# Patient Record
Sex: FEMALE | Race: OTHER | NOT HISPANIC OR LATINO | URBAN - METROPOLITAN AREA
[De-identification: names, ages, dates, MRNs, and addresses within clinical notes are randomized per-mention and may not be internally consistent; named-entity substitution may affect disease eponyms.]

---

## 2019-01-04 ENCOUNTER — OUTPATIENT (OUTPATIENT)
Dept: OUTPATIENT SERVICES | Facility: HOSPITAL | Age: 34
LOS: 1 days | End: 2019-01-04

## 2019-01-04 VITALS
TEMPERATURE: 98 F | RESPIRATION RATE: 14 BRPM | SYSTOLIC BLOOD PRESSURE: 106 MMHG | HEIGHT: 65 IN | DIASTOLIC BLOOD PRESSURE: 70 MMHG | OXYGEN SATURATION: 99 % | HEART RATE: 74 BPM | WEIGHT: 214.95 LBS

## 2019-01-04 DIAGNOSIS — O03.9 COMPLETE OR UNSPECIFIED SPONTANEOUS ABORTION WITHOUT COMPLICATION: Chronic | ICD-10-CM

## 2019-01-04 DIAGNOSIS — Z33.2 ENCOUNTER FOR ELECTIVE TERMINATION OF PREGNANCY: ICD-10-CM

## 2019-01-04 DIAGNOSIS — Z98.891 HISTORY OF UTERINE SCAR FROM PREVIOUS SURGERY: Chronic | ICD-10-CM

## 2019-01-04 DIAGNOSIS — Z98.890 OTHER SPECIFIED POSTPROCEDURAL STATES: Chronic | ICD-10-CM

## 2019-01-04 LAB
BLD GP AB SCN SERPL QL: NEGATIVE — SIGNIFICANT CHANGE UP
HCT VFR BLD CALC: 40.1 % — SIGNIFICANT CHANGE UP (ref 34.5–45)
HGB BLD-MCNC: 13.5 G/DL — SIGNIFICANT CHANGE UP (ref 11.5–15.5)
MCHC RBC-ENTMCNC: 29.3 PG — SIGNIFICANT CHANGE UP (ref 27–34)
MCHC RBC-ENTMCNC: 33.7 % — SIGNIFICANT CHANGE UP (ref 32–36)
MCV RBC AUTO: 87.2 FL — SIGNIFICANT CHANGE UP (ref 80–100)
NRBC # FLD: 0 — SIGNIFICANT CHANGE UP
PLATELET # BLD AUTO: 202 K/UL — SIGNIFICANT CHANGE UP (ref 150–400)
PMV BLD: 10.9 FL — SIGNIFICANT CHANGE UP (ref 7–13)
RBC # BLD: 4.6 M/UL — SIGNIFICANT CHANGE UP (ref 3.8–5.2)
RBC # FLD: 12.9 % — SIGNIFICANT CHANGE UP (ref 10.3–14.5)
RH IG SCN BLD-IMP: NEGATIVE — SIGNIFICANT CHANGE UP
WBC # BLD: 9.69 K/UL — SIGNIFICANT CHANGE UP (ref 3.8–10.5)
WBC # FLD AUTO: 9.69 K/UL — SIGNIFICANT CHANGE UP (ref 3.8–10.5)

## 2019-01-04 RX ORDER — SODIUM CHLORIDE 9 MG/ML
1000 INJECTION, SOLUTION INTRAVENOUS
Qty: 0 | Refills: 0 | Status: DISCONTINUED | OUTPATIENT
Start: 2019-01-07 | End: 2019-01-22

## 2019-01-04 NOTE — H&P PST ADULT - NSANTHOSAYNRD_GEN_A_CORE
No. DAMIEN screening performed.  STOP BANG Legend: 0-2 = LOW Risk; 3-4 = INTERMEDIATE Risk; 5-8 = HIGH Risk

## 2019-01-04 NOTE — H&P PST ADULT - GASTROINTESTINAL DETAILS
no guarding/soft/no masses palpable/no distention/bowel sounds normal/no bruit/no rebound tenderness/no rigidity/no organomegaly/nontender

## 2019-01-04 NOTE — H&P PST ADULT - RS GEN PE MLT RESP DETAILS PC
clear to auscultation bilaterally/no intercostal retractions/no rales/no chest wall tenderness/no rhonchi/breath sounds equal/respirations non-labored/good air movement/no wheezes

## 2019-01-04 NOTE — H&P PST ADULT - NEGATIVE GENERAL GENITOURINARY SYMPTOMS
no hematuria/no dysuria/no flank pain L/no flank pain R/no bladder infections/normal urinary frequency

## 2019-01-04 NOTE — H&P PST ADULT - NEGATIVE GENERAL SYMPTOMS
no fatigue/no malaise/no sweating/no anorexia/no chills/no weight loss/no fever/no polyphagia/no polyuria/no polydipsia

## 2019-01-04 NOTE — H&P PST ADULT - NEGATIVE CARDIOVASCULAR SYMPTOMS
no paroxysmal nocturnal dyspnea/no peripheral edema/no chest pain/no claudication/no palpitations/no orthopnea/no dyspnea on exertion

## 2019-01-04 NOTE — H&P PST ADULT - PROBLEM SELECTOR PLAN 1
Pt scheduled for elective dilation vacuum curettage on 1/7/2019.  labs done results pending, Preop teaching done, pt able to verbalize understanding.

## 2019-01-04 NOTE — H&P PST ADULT - HISTORY OF PRESENT ILLNESS
34y/o female scheduled for elective dilation vacuum curettage on 2019.  Pt states, "LMP 2018, elective ."

## 2019-01-06 ENCOUNTER — TRANSCRIPTION ENCOUNTER (OUTPATIENT)
Age: 34
End: 2019-01-06

## 2019-01-07 ENCOUNTER — OUTPATIENT (OUTPATIENT)
Dept: OUTPATIENT SERVICES | Facility: HOSPITAL | Age: 34
LOS: 1 days | Discharge: HOME | End: 2019-01-07
Payer: COMMERCIAL

## 2019-01-07 ENCOUNTER — RESULT REVIEW (OUTPATIENT)
Age: 34
End: 2019-01-07

## 2019-01-07 VITALS
DIASTOLIC BLOOD PRESSURE: 62 MMHG | RESPIRATION RATE: 16 BRPM | HEIGHT: 65 IN | TEMPERATURE: 99 F | SYSTOLIC BLOOD PRESSURE: 112 MMHG | WEIGHT: 214.95 LBS | HEART RATE: 64 BPM | OXYGEN SATURATION: 99 %

## 2019-01-07 VITALS
HEART RATE: 66 BPM | DIASTOLIC BLOOD PRESSURE: 62 MMHG | SYSTOLIC BLOOD PRESSURE: 120 MMHG | RESPIRATION RATE: 16 BRPM | OXYGEN SATURATION: 100 %

## 2019-01-07 DIAGNOSIS — O03.9 COMPLETE OR UNSPECIFIED SPONTANEOUS ABORTION WITHOUT COMPLICATION: Chronic | ICD-10-CM

## 2019-01-07 DIAGNOSIS — Z98.891 HISTORY OF UTERINE SCAR FROM PREVIOUS SURGERY: Chronic | ICD-10-CM

## 2019-01-07 DIAGNOSIS — Z33.2 ENCOUNTER FOR ELECTIVE TERMINATION OF PREGNANCY: ICD-10-CM

## 2019-01-07 DIAGNOSIS — Z98.890 OTHER SPECIFIED POSTPROCEDURAL STATES: Chronic | ICD-10-CM

## 2019-01-07 LAB
BLD GP AB SCN SERPL QL: NEGATIVE — SIGNIFICANT CHANGE UP
RH IG SCN BLD-IMP: NEGATIVE — SIGNIFICANT CHANGE UP

## 2019-01-07 PROCEDURE — 88305 TISSUE EXAM BY PATHOLOGIST: CPT | Mod: 26

## 2019-01-07 RX ORDER — IBUPROFEN 200 MG
1 TABLET ORAL
Qty: 0 | Refills: 0 | COMMUNITY

## 2019-01-07 NOTE — BRIEF OPERATIVE NOTE - PROCEDURE
<<-----Click on this checkbox to enter Procedure Dilation and curettage  01/07/2019    Active  [id number]

## 2019-01-07 NOTE — ASU DISCHARGE PLAN (ADULT/PEDIATRIC). - NOTIFY
GYN Fever>100.4/Excessive Diarrhea/Bleeding that does not stop/Persistent Nausea and Vomiting/Inability to Tolerate Liquids or Foods/Pain not relieved by Medications/Unable to Urinate

## 2019-01-07 NOTE — ASU DISCHARGE PLAN (ADULT/PEDIATRIC). - ACTIVITY LEVEL
nothing per rectum/no tampons/no douching/no intercourse/nothing per vagina/no tub baths/no heavy lifting

## 2019-01-07 NOTE — BRIEF OPERATIVE NOTE - OPERATION/FINDINGS
7wk size uterus with left sided posterior fundal fibroid; anteverted; tubes and ovaries nonpalpable bilaterally

## 2021-04-22 PROBLEM — Z33.2 ENCOUNTER FOR ELECTIVE TERMINATION OF PREGNANCY: Chronic | Status: ACTIVE | Noted: 2019-01-04

## 2021-04-22 PROBLEM — E66.9 OBESITY, UNSPECIFIED: Chronic | Status: ACTIVE | Noted: 2019-01-04

## 2021-04-30 ENCOUNTER — APPOINTMENT (OUTPATIENT)
Dept: INTERNAL MEDICINE | Facility: CLINIC | Age: 36
End: 2021-04-30

## 2021-05-03 NOTE — ASU PREOP CHECKLIST - ANTIBIOTIC
Given   apixaBAN (Eliquis) 5 MG Tab 180 tablet 1 2/15/2021     Sig - Route: Take 1 tablet by mouth every 12 hours. - Oral    Sent to pharmacy as: Apixaban 5 MG Oral Tablet (Eliquis)    Class: Eprescribe    E-Prescribing Status: Receipt confirmed by pharmacy (2/15/2021  3:02 PM CST)      Request too soon , denied   
n/a

## 2022-03-15 NOTE — PACU DISCHARGE NOTE - HYDRATION STATUS:
Patient called after her appointment today with Dr. Benny Alfaro and SARAH with c/o pain to her medial breast. She was called back and we discussed that the pain is likely from the expander fold and she was reassured that this pain will improve with each fill. She stated she was still taking norco for pain. She was advised to start taking ibuprofen in between doses and to apply extra padding around and in between her breasts. She was instructed to call us if the pain continues or worsens over the next few days or if she develops any fevers, redness, bruising, or excess swelling. Patient verbalized understanding and appreciative of call. Satisfactory

## 2025-03-21 NOTE — H&P PST ADULT - IS PATIENT PREGNANT?
Patient was scheduled for a Cath/Possible PTCA on 4/4/25 at 7:00 AM.  Arrival time 5:30 AM.    Diagnosis Abnormal Cardiovascular NM Stress test with symptoms of dyspnea, angina pectoris, near syncope.     Patient allergic to shell fish and shrimp and Iodine.  Check if patient has had IV contrast before.      (3/12/25) Last EF 65%.    (11/26/25) Last Creat 0.85 and GFR 72.  Needs updated labs.  Ordered BMP, CBC, and Mg.      Medications to hold:  celecoxib (celebrex) and all vitamin and supplements the morning of the procedure.      Letter with pre-procedure instructions sent to patient's [facility] well for review.    Called patient with pre-procedure instructions contained in the letter sent.  She stated that she will review the instructions.  Asked her to hold the celcoxib (celebrex) and all vitamin and supplements the morning of the procedure.  Reviewed lab work that needed to be done.  Discussed with her about if she has ever had a reaction to IV contrast for CT scan.  She reported that she did not.  No other questions at this time.      Orders placed.   Prep completed.         yes